# Patient Record
Sex: FEMALE | Race: WHITE | Employment: STUDENT | URBAN - METROPOLITAN AREA
[De-identification: names, ages, dates, MRNs, and addresses within clinical notes are randomized per-mention and may not be internally consistent; named-entity substitution may affect disease eponyms.]

---
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
## 2022-07-08 ENCOUNTER — OFFICE VISIT (OUTPATIENT)
Dept: PODIATRY CLINIC | Facility: CLINIC | Age: 22
End: 2022-07-08
Payer: COMMERCIAL

## 2022-07-08 DIAGNOSIS — M25.374 FOOT JOINT INSTABILITY, RIGHT: ICD-10-CM

## 2022-07-08 DIAGNOSIS — M24.271 LIGAMENTOUS LAXITY OF RIGHT ANKLE: ICD-10-CM

## 2022-07-08 DIAGNOSIS — M79.2 NEURITIS: Primary | ICD-10-CM

## 2022-07-08 DIAGNOSIS — M20.11 HALLUX VALGUS, RIGHT: ICD-10-CM

## 2022-07-08 RX ORDER — ESCITALOPRAM OXALATE 10 MG/1
10 TABLET ORAL DAILY
COMMUNITY
Start: 2022-05-28

## 2022-07-11 NOTE — PATIENT INSTRUCTIONS
- Recommend ambulating with supportive shoes with wide toe box that do not rub on ankle or bunion deformity. Try and stay in these types of shoes for approximately 1 month. If symptoms worsen or fail to improve we could consider cortisone injection for area of neuritis or possible EMG/NCV.  -Follow-up in 1 month for reevaluation symptoms worsen or fail to improve.